# Patient Record
(demographics unavailable — no encounter records)

---

## 2024-11-19 NOTE — CONSULT LETTER
[Dear  ___] : Dear  [unfilled], [Courtesy Letter:] : I had the pleasure of seeing your patient, [unfilled], in my office today. [Please see my note below.] : Please see my note below. [Consult Closing:] : Thank you very much for allowing me to participate in the care of this patient.  If you have any questions, please do not hesitate to contact me. [Sincerely,] : Sincerely, [FreeTextEntry3] : Shawnee Martin MD\par  Director, Pediatric Endocrinology\par  [facility]\par  [facility]\par

## 2024-11-19 NOTE — PHYSICAL EXAM
[Healthy Appearing] : healthy appearing [Well Nourished] : well nourished [Interactive] : interactive [Obese] : obese [Acanthosis Nigricans___] : acanthosis nigricans over [unfilled] [Hirsutism] : hirsutism [Normal Appearance] : normal appearance [Normal S1 and S2] : normal S1 and S2 [Clear to Ausculation Bilaterally] : clear to auscultation bilaterally [Abdomen Soft] : soft [Abdomen Tenderness] : non-tender [Normal] : normal  [Goiter] : no goiter [Murmur] : no murmurs [de-identified] : below age level, conversive, very cooperative, weight loss 2kg/3m [de-identified] : abdomen shaven with stubbles [de-identified] : normal oropharynx [de-identified] : nonfocal

## 2024-11-19 NOTE — HISTORY OF PRESENT ILLNESS
[1] :  blood sugar levels are tested 1 time per day [Regular Periods] : regular periods [FreeTextEntry2] : Elis is a 20y4m F here for follow-up of T2DM.  Elis is taking metformin BID consistently, no complaints.   Has been strict with diet and has lost weight.  Going for walks daily with dog: dancing. Improved BGs.  No intercurrent illness.  Hirsutism, mom says less bothered by this now.  Regular menses.  Mother s/p kidney + pancreas transplant 1/2024 [place]   - BLOOD SUGAR TESTING PATTERN: tests 2x day; did  bring log book; Takes BS  2x week , once in the morning fasting then 2 hours after dinner; fasting 82-90, 2hr post dinner 137-150 - INSULIN GIVEN BY: no insulin; - TIMES OF HYPERGLYCEMIA: none as per mom - TIMES OF HYPOGLYCEMIA:  no bG < 70 as per mom - PARENTAL PART IN CARE: parents responsible for all care; - RECENT HOSPITALIZATIONS: none;  - RECENT ILLNESS: none but has a dry cough  - ACTIVITY LEVEL:  Dancing in the morning for half an hour with loud music at 5:00 am daily; Mom said they joined a gym but has not gone yet, mom reports that she will return to work/study program-works in [employer] 3 hours a day 3x a week; only have gone 2-3 times were there for an hour; planted a garden with her uncle; tends to the garden daily, goes on walks with dog - MENSTRUAL HISTORY: LMP 11/11/24 regular periods lasting 3-4 days - DIABETES EDUCATION TOPICS COVERED: reviewed sick day guidelines, reinforced need to wear diabetes medic alert bracelet or necklace, importance of taking diabetes medications consistently -PHYSICIAN REVIEW OF DATA: reviewed patient log of blood sugars, wakeups 80s-90s, postprandial low to mid 100s  Other: - Ophtho - 05/2024 at school - Dental - 05/24 - Vaccines - 11/19/24 flu vaccine - CDE - 8/2016 - Nutrition - 8/5/20 - Annual labs 5/24 - Dev: in school, work study program, working at [employer], teach transitioning  Medications: Metformin 1000mg twice a day-denies any GI symptoms.  Diet Recall Breakfast 5-6:30 AM sandwich multigrain and wheat, ham and cheese, bowl of cereal, chicken mom states she's less starch , leftovers from night before Lunch 11:30 PM packed lunch ham and cheese sandwich wheat bread,  fruit ,vegetables Dinner 6:00PM chicken, beef, pork stew, rice , beans , mom tries to keep carbs to less than fistful portion Snacks; fruits, yogurt, vegetables, occasional sweets but within 13-15g of sugar Drinks: just water   [FreeTextEntry1] : menarche @10yo 2/2003, LMP: 07/16/24 regular lasting 3-4 days

## 2024-11-19 NOTE — DISCUSSION/SUMMARY
[FreeTextEntry1] : Elis has Type 2 DM, morbid obesity, hirsutism.  Additionally, she is diagnosed with Autism and Vitiligo. She has been well controlled on metformin.  Excellent HbA1C.  Monitoring labs otherwise normal.  Excellent changes to diet and physical activity with excellent weight loss. To continue metformin twice daily along with excellent improvements to lifestyle. Flu vaccine today.

## 2024-11-19 NOTE — END OF VISIT
[Dear  ___] : Dear  [unfilled], [Consult Closing:] : Thank you very much for allowing me to participate in the care of this patient.  If you have any questions, please do not hesitate to contact me. [FreeTextEntry3] : Trevor Herring MD [Time Spent: ___ minutes] : I have spent [unfilled] minutes of time on the encounter which excludes teaching and separately reported services.

## 2024-11-19 NOTE — PHYSICAL EXAM
[Healthy Appearing] : healthy appearing [Well Nourished] : well nourished [Interactive] : interactive [Obese] : obese [Acanthosis Nigricans___] : acanthosis nigricans over [unfilled] [Hirsutism] : hirsutism [Normal Appearance] : normal appearance [Normal S1 and S2] : normal S1 and S2 [Clear to Ausculation Bilaterally] : clear to auscultation bilaterally [Abdomen Soft] : soft [Abdomen Tenderness] : non-tender [Normal] : normal  [Goiter] : no goiter [Murmur] : no murmurs [de-identified] : below age level, conversive, very cooperative, weight loss 2kg/3m [de-identified] : abdomen shaven with stubbles [de-identified] : normal oropharynx [de-identified] : nonfocal

## 2024-11-19 NOTE — CONSULT LETTER
[Dear  ___] : Dear  [unfilled], [Courtesy Letter:] : I had the pleasure of seeing your patient, [unfilled], in my office today. [Please see my note below.] : Please see my note below. [Consult Closing:] : Thank you very much for allowing me to participate in the care of this patient.  If you have any questions, please do not hesitate to contact me. [Sincerely,] : Sincerely, [FreeTextEntry3] : Shawnee Martin MD\par  Director, Pediatric Endocrinology\par  [facility]\par  [facility]\par

## 2024-11-19 NOTE — REVIEW OF SYSTEMS
[Nl] : Neurological [Change in Activity] : no change in activity [Change in Vision] : no change in vision  [Abdominal Pain] : no abdominal pain [Urinary Frequency] : no urinary frequency [Headache] : no headache

## 2024-11-19 NOTE — HISTORY OF PRESENT ILLNESS
[1] :  blood sugar levels are tested 1 time per day [Regular Periods] : regular periods [FreeTextEntry2] : Elis is a 20y4m F here for follow-up of T2DM.  Elis is taking metformin BID consistently, no complaints.   Has been strict with diet and has lost weight.  Going for walks daily with dog: dancing. Improved BGs.  No intercurrent illness.  Hirsutism, mom says less bothered by this now.  Regular menses.  Mother s/p kidney + pancreas transplant 1/2024 [place]   - BLOOD SUGAR TESTING PATTERN: tests 2x day; did  bring log book; Takes BS  2x week , once in the morning fasting then 2 hours after dinner; fasting 82-90, 2hr post dinner 137-150 - INSULIN GIVEN BY: no insulin; - TIMES OF HYPERGLYCEMIA: none as per mom - TIMES OF HYPOGLYCEMIA:  no bG < 70 as per mom - PARENTAL PART IN CARE: parents responsible for all care; - RECENT HOSPITALIZATIONS: none;  - RECENT ILLNESS: none but has a dry cough  - ACTIVITY LEVEL:  Dancing in the morning for half an hour with loud music at 5:00 am daily; Mom said they joined a gym but has not gone yet, mom reports that she will return to work/study program-works in [employer] 3 hours a day 3x a week; only have gone 2-3 times were there for an hour; planted a garden with her uncle; tends to the garden daily, goes on walks with dog - MENSTRUAL HISTORY: LMP 11/11/24 regular periods lasting 3-4 days - DIABETES EDUCATION TOPICS COVERED: reviewed sick day guidelines, reinforced need to wear diabetes medic alert bracelet or necklace, importance of taking diabetes medications consistently -PHYSICIAN REVIEW OF DATA: reviewed patient log of blood sugars, wakeups 80s-90s, postprandial low to mid 100s  Other: - Ophtho - 05/2024 at school - Dental - 05/24 - Vaccines - 11/19/24 flu vaccine - CDE - 8/2016 - Nutrition - 8/5/20 - Annual labs 5/24 - Dev: in school, work study program, working at [employer], teach transitioning  Medications: Metformin 1000mg twice a day-denies any GI symptoms.  Diet Recall Breakfast 5-6:30 AM sandwich multigrain and wheat, ham and cheese, bowl of cereal, chicken mom states she's less starch , leftovers from night before Lunch 11:30 PM packed lunch ham and cheese sandwich wheat bread,  fruit ,vegetables Dinner 6:00PM chicken, beef, pork stew, rice , beans , mom tries to keep carbs to less than fistful portion Snacks; fruits, yogurt, vegetables, occasional sweets but within 13-15g of sugar Drinks: just water   [FreeTextEntry1] : menarche @8yo 2/2003, LMP: 07/16/24 regular lasting 3-4 days

## 2025-02-25 NOTE — PHYSICAL EXAM
[Healthy Appearing] : healthy appearing [Well Nourished] : well nourished [Interactive] : interactive [Obese] : obese [Acanthosis Nigricans___] : acanthosis nigricans over [unfilled] [Hirsutism] : hirsutism [Normal Appearance] : normal appearance [Normal S1 and S2] : normal S1 and S2 [Clear to Ausculation Bilaterally] : clear to auscultation bilaterally [Abdomen Soft] : soft [Abdomen Tenderness] : non-tender [Normal] : normal  [Goiter] : no goiter [Murmur] : no murmurs [de-identified] : below age level, conversive, very cooperative, weight loss ~3kg/3m [de-identified] : normal oropharynx [de-identified] : nonfocal

## 2025-02-25 NOTE — REVIEW OF SYSTEMS
[Nl] : Neurological [Change in Activity] : no change in activity [Change in Vision] : no change in vision  [Abdominal Pain] : no abdominal pain [Constipation] : no constipation [Urinary Frequency] : no urinary frequency [Headache] : no headache

## 2025-02-25 NOTE — CONSULT LETTER
[Dear  ___] : Dear  [unfilled], [Courtesy Letter:] : I had the pleasure of seeing your patient, [unfilled], in my office today. [Please see my note below.] : Please see my note below. [Consult Closing:] : Thank you very much for allowing me to participate in the care of this patient.  If you have any questions, please do not hesitate to contact me. [Sincerely,] : Sincerely, [FreeTextEntry3] : Shawnee Martin MD\par  Director, Pediatric Endocrinology\par  [facility]\par  [facility]\par

## 2025-02-25 NOTE — HISTORY OF PRESENT ILLNESS
[1] :  blood sugar levels are tested 1 time per day [Regular Periods] : regular periods [FreeTextEntry2] : Elis is a 20y4m F here for follow-up of T2DM.  Elis is taking metformin BID consistently, no complaints.   Has been strict with diet and has lost weight.  Going for walks daily with dog: goes to gym once in 2 weeks. Improved BGs.  Taking metformin consistently, rare omission.  No intercurrent illness.  Hirsutism, mom says less bothered by this now.  Has lessened.  Regular menses.  Tolerating well.  (Mother s/p kidney + pancreas transplant 1/2024)  - BLOOD SUGAR TESTING PATTERN: tests 2x day; did bring log book; Takes BS  2x week , once in the morning fasting then 2 hours after dinner; fasting 85-92, 2hr post dinner 136-152 - INSULIN GIVEN BY: no insulin; - TIMES OF HYPERGLYCEMIA: none as per mom - TIMES OF HYPOGLYCEMIA:  no bG < 70 as per mom - PARENTAL PART IN CARE: parents responsible for all care; - RECENT HOSPITALIZATIONS: none;  - RECENT ILLNESS: none - ACTIVITY LEVEL:  Dancing in the morning for half an hour with loud music at 5:00 am daily; Mom said they joined a gym but has gone once every two weeks, mom reports that she will return to work/study program-works in [employer] 3 hours a day 3x a week; only have gone 2-3 times were there for an hour; planted a garden with her uncle; tends to the garden daily, goes on walks with dog - MENSTRUAL HISTORY: LMP 02/12/25 regular periods lasting 3-4 days - DIABETES EDUCATION TOPICS COVERED: reviewed sick day guidelines, reinforced need to wear diabetes medic alert bracelet or necklace, importance of taking diabetes medications consistently -PHYSICIAN REVIEW OF DATA: reviewed patient log of blood sugars, wakeups 80s-90s, postprandial low to mid 100s  Other: - Ophtho - 05/2024 at school - Dental - 05/24; DUE - Vaccines - 11/19/24 flu vaccine - CDE - 8/2016 - Nutrition - 8/5/20 - Annual labs 5/2024 - Dev: in school, work study program, working at [employer], teach transitioning  Has adult endo scheduled in July 2025  Medications: Metformin 1000mg twice a day-denies any GI symptoms, missed one or two doses this month  Diet Recall Breakfast 5-6:30 AM sandwich multigrain and wheat, ham and cheese, bowl of cereal, chicken mom states she's less starch , leftovers from night before Lunch 11:30 PM packed lunch ham and cheese sandwich wheat bread,  fruit ,vegetables Dinner 6:00PM chicken, beef, pork stew, rice , beans , mom tries to keep carbs to less than fistful portion Snacks; fruits, yogurt, vegetables, occasional sweets but within 15g-18g of carbs or of sugar Drinks: just water   [FreeTextEntry1] : menarche @8yo 2/2003, LMP: 2/12/25 regular lasting 3-4 days

## 2025-02-25 NOTE — DISCUSSION/SUMMARY
[FreeTextEntry1] : Elis has Type 2 DM well controlled, morbid obesity, and hirsutism.  Additionally, she is diagnosed with Autism and Vitiligo. She has been well controlled on metformin.  Excellent changes to diet and physical activity with excellent weight loss. To continue metformin twice daily along with excellent improvements to lifestyle. Continue monitoring BGs 2x/wk.  Transition to adult endo - scheduled 7/2025, to return to us once more after to ensure smooth transition.  To do annual monitoring labs now before transition.

## 2025-05-16 NOTE — PHYSICAL EXAM
[No Acute Distress] : no acute distress [Soft] : soft [Non-tender] : non-tender [Non-distended] : non-distended [Oriented x3] : oriented x3 [Examination Of The Breasts] : a normal appearance [No Masses] : no breast masses were palpable [Labia Majora] : normal [Labia Minora] : normal [Normal] : normal [Uterine Adnexae] : normal